# Patient Record
Sex: FEMALE | Race: WHITE | NOT HISPANIC OR LATINO | URBAN - NONMETROPOLITAN AREA
[De-identification: names, ages, dates, MRNs, and addresses within clinical notes are randomized per-mention and may not be internally consistent; named-entity substitution may affect disease eponyms.]

---

## 2020-12-10 ENCOUNTER — OFFICE VISIT (OUTPATIENT)
Dept: URBAN - NONMETROPOLITAN AREA CLINIC 13 | Facility: CLINIC | Age: 25
End: 2020-12-10
Payer: COMMERCIAL

## 2020-12-10 DIAGNOSIS — Z3A.01 LESS THAN 8 WEEKS GESTATION OF PREGNANCY: ICD-10-CM

## 2020-12-10 DIAGNOSIS — K62.5 RECTAL BLEEDING: ICD-10-CM

## 2020-12-10 PROCEDURE — G8482 FLU IMMUNIZE ORDER/ADMIN: HCPCS | Performed by: INTERNAL MEDICINE

## 2020-12-10 PROCEDURE — 99205 OFFICE O/P NEW HI 60 MIN: CPT | Performed by: INTERNAL MEDICINE

## 2020-12-10 PROCEDURE — 99204 OFFICE O/P NEW MOD 45 MIN: CPT | Performed by: INTERNAL MEDICINE

## 2020-12-10 PROCEDURE — 1036F TOBACCO NON-USER: CPT | Performed by: INTERNAL MEDICINE

## 2020-12-10 PROCEDURE — G8427 DOCREV CUR MEDS BY ELIG CLIN: HCPCS | Performed by: INTERNAL MEDICINE

## 2020-12-10 PROCEDURE — G8420 CALC BMI NORM PARAMETERS: HCPCS | Performed by: INTERNAL MEDICINE

## 2020-12-10 PROCEDURE — G9903 PT SCRN TBCO ID AS NON USER: HCPCS | Performed by: INTERNAL MEDICINE

## 2020-12-10 NOTE — PHYSICAL EXAM GASTROINTESTINAL
Abdomen , soft, nontender, nondistended , no guarding or rigidity , no masses palpable , normal bowel sounds , Liver and Spleen , no hepatomegaly present , no hepatosplenomegaly , liver nontender , spleen not palpable , Rectal , normal sphincter tone , no internal hemorrhoids, rectal masses. There is blood on the examing glove and the stool is strongly heme +.

## 2020-12-10 NOTE — HPI-TODAY'S VISIT:
This is the first office visit for this 25-year-old white female who presents with a month history of rectal bleeding.  Patient is 5 weeks pregnant. The blood is mixed in with the stool and does color the water in the commode red.  She never passes blood alone.  She states that her stools are softer than normal but appear to be adequate.  She has no diarrhea.  She has a single bowel movement daily.  She does have the urge to defecate without passing stool several times a  day.  She may, on occasion, pass mucus streaked with blood.  She has no rectal pain, burning or itching.  She is not aware of any significant hemorrhoids.  She has no abdominal pain but does feel bloated.  She is currently on no medications other than prenatal vitamins. There is no family history of colon polyps or colon cancer.  Her 3-year-old daughter has ulcerative colitis and is controlled on Remicade.  Patient did go to the emergency room because of the bleeding.  Her labs there were normal.

## 2020-12-14 ENCOUNTER — TELEPHONE ENCOUNTER (OUTPATIENT)
Dept: URBAN - METROPOLITAN AREA CLINIC 92 | Facility: CLINIC | Age: 25
End: 2020-12-14

## 2020-12-14 ENCOUNTER — LAB OUTSIDE AN ENCOUNTER (OUTPATIENT)
Dept: URBAN - METROPOLITAN AREA CLINIC 92 | Facility: CLINIC | Age: 25
End: 2020-12-14

## 2020-12-14 RX ORDER — SODIUM PICOSULFATE, MAGNESIUM OXIDE, AND ANHYDROUS CITRIC ACID 10; 3.5; 12 MG/160ML; G/160ML; G/160ML
DRINK 160 ML LIQUID ORAL
Qty: 1 KIT | Refills: 0 | OUTPATIENT
Start: 2020-12-14 | End: 2020-12-15

## 2020-12-17 ENCOUNTER — TELEPHONE ENCOUNTER (OUTPATIENT)
Dept: URBAN - NONMETROPOLITAN AREA CLINIC 2 | Facility: CLINIC | Age: 25
End: 2020-12-17

## 2020-12-28 ENCOUNTER — OFFICE VISIT (OUTPATIENT)
Dept: URBAN - METROPOLITAN AREA MEDICAL CENTER 1 | Facility: MEDICAL CENTER | Age: 25
End: 2020-12-28
Payer: COMMERCIAL

## 2020-12-28 DIAGNOSIS — K51.011 CHRONIC ULCERATIVE ENTEROCOLITIS WITH RECTAL BLEEDING: ICD-10-CM

## 2020-12-28 PROCEDURE — G9937 DIG OR SURV COLSCO: HCPCS | Performed by: INTERNAL MEDICINE

## 2020-12-28 PROCEDURE — 45380 COLONOSCOPY AND BIOPSY: CPT | Performed by: INTERNAL MEDICINE

## 2020-12-30 ENCOUNTER — TELEPHONE ENCOUNTER (OUTPATIENT)
Dept: URBAN - METROPOLITAN AREA CLINIC 92 | Facility: CLINIC | Age: 25
End: 2020-12-30

## 2020-12-30 RX ORDER — CERTOLIZUMAB PEGOL 400 MG
AS DIRECTED KIT SUBCUTANEOUS
Qty: 1 KIT | Refills: 6 | Status: ACTIVE | COMMUNITY
Start: 2020-12-30 | End: 2021-01-06

## 2020-12-30 RX ORDER — DICYCLOMINE HYDROCHLORIDE 10 MG/1
1 TABLET CAPSULE ORAL THREE TIMES A DAY
Qty: 90 TABLET | Refills: 3 | OUTPATIENT
Start: 2020-12-30 | End: 2021-04-29

## 2020-12-30 RX ORDER — CERTOLIZUMAB PEGOL 400 MG
AS DIRECTED KIT SUBCUTANEOUS
Qty: 1 KIT | Refills: 6 | OUTPATIENT
Start: 2020-12-30 | End: 2021-01-06

## 2021-01-08 ENCOUNTER — TELEPHONE ENCOUNTER (OUTPATIENT)
Dept: URBAN - NONMETROPOLITAN AREA CLINIC 1 | Facility: CLINIC | Age: 26
End: 2021-01-08

## 2021-01-08 ENCOUNTER — TELEPHONE ENCOUNTER (OUTPATIENT)
Dept: URBAN - METROPOLITAN AREA CLINIC 92 | Facility: CLINIC | Age: 26
End: 2021-01-08

## 2021-01-08 RX ORDER — ADALIMUMAB 80MG/0.8ML
2 SHOTS SQ ON DAY 1 FOLLOWED BY 1 SHOT ON DAY 15, THEN BEGIN 40 MG QOWK ON DAY 29 KIT SUBCUTANEOUS ONCE
Qty: 1 KIT | Refills: 0 | OUTPATIENT
Start: 2021-01-08 | End: 2021-02-07

## 2021-01-08 RX ORDER — DICYCLOMINE HYDROCHLORIDE 10 MG/1
1 TABLET CAPSULE ORAL THREE TIMES A DAY
Qty: 90 TABLET | Refills: 3 | Status: ACTIVE | COMMUNITY
Start: 2020-12-30 | End: 2021-04-29

## 2021-01-09 ENCOUNTER — TELEPHONE ENCOUNTER (OUTPATIENT)
Dept: URBAN - METROPOLITAN AREA CLINIC 92 | Facility: CLINIC | Age: 26
End: 2021-01-09

## 2021-01-09 ENCOUNTER — LAB OUTSIDE AN ENCOUNTER (OUTPATIENT)
Dept: URBAN - METROPOLITAN AREA CLINIC 92 | Facility: CLINIC | Age: 26
End: 2021-01-09

## 2021-01-21 ENCOUNTER — TELEPHONE ENCOUNTER (OUTPATIENT)
Dept: URBAN - METROPOLITAN AREA SURGERY CENTER 30 | Facility: SURGERY CENTER | Age: 26
End: 2021-01-21

## 2021-02-05 ENCOUNTER — TELEPHONE ENCOUNTER (OUTPATIENT)
Dept: URBAN - NONMETROPOLITAN AREA CLINIC 1 | Facility: CLINIC | Age: 26
End: 2021-02-05

## 2021-02-16 ENCOUNTER — TELEPHONE ENCOUNTER (OUTPATIENT)
Dept: URBAN - NONMETROPOLITAN AREA CLINIC 2 | Facility: CLINIC | Age: 26
End: 2021-02-16

## 2021-03-01 ENCOUNTER — TELEPHONE ENCOUNTER (OUTPATIENT)
Dept: URBAN - NONMETROPOLITAN AREA CLINIC 1 | Facility: CLINIC | Age: 26
End: 2021-03-01

## 2021-03-01 RX ORDER — DICYCLOMINE HYDROCHLORIDE 10 MG/1
1 TABLET CAPSULE ORAL THREE TIMES A DAY
Qty: 90 TABLET | Refills: 3 | COMMUNITY
Start: 2020-12-30 | End: 2021-04-29

## 2021-03-12 ENCOUNTER — TELEPHONE ENCOUNTER (OUTPATIENT)
Dept: URBAN - NONMETROPOLITAN AREA CLINIC 1 | Facility: CLINIC | Age: 26
End: 2021-03-12

## 2021-04-02 ENCOUNTER — TELEPHONE ENCOUNTER (OUTPATIENT)
Dept: URBAN - NONMETROPOLITAN AREA CLINIC 1 | Facility: CLINIC | Age: 26
End: 2021-04-02

## 2021-05-04 ENCOUNTER — TELEPHONE ENCOUNTER (OUTPATIENT)
Dept: URBAN - NONMETROPOLITAN AREA CLINIC 1 | Facility: CLINIC | Age: 26
End: 2021-05-04

## 2021-05-06 ENCOUNTER — OFFICE VISIT (OUTPATIENT)
Dept: URBAN - NONMETROPOLITAN AREA CLINIC 13 | Facility: CLINIC | Age: 26
End: 2021-05-06
Payer: COMMERCIAL

## 2021-05-06 ENCOUNTER — WEB ENCOUNTER (OUTPATIENT)
Dept: URBAN - NONMETROPOLITAN AREA CLINIC 13 | Facility: CLINIC | Age: 26
End: 2021-05-06

## 2021-05-06 DIAGNOSIS — K51.211 ULCERATIVE PROCTITIS WITH RECTAL BLEEDING: ICD-10-CM

## 2021-05-06 DIAGNOSIS — K62.5 RECTAL BLEEDING: ICD-10-CM

## 2021-05-06 PROCEDURE — 99214 OFFICE O/P EST MOD 30 MIN: CPT | Performed by: INTERNAL MEDICINE

## 2021-05-06 RX ORDER — ADALIMUMAB 40MG/0.4ML
0.4 ML KIT SUBCUTANEOUS EVERY 2 WEEKS
Status: ACTIVE | COMMUNITY

## 2021-05-06 RX ORDER — PREDNISONE 10 MG/1
4 TABLETS X 3 DAYS, THEN 3 TABLETS X 3 DAYS, THEN 2 TABLETS X 3 DAYS, THEN 1 TABLET X 3 DAYS TABLET ORAL ONCE A DAY
Qty: 30 TABLETS | Refills: 0 | OUTPATIENT
Start: 2021-05-06 | End: 2021-05-18

## 2021-05-06 NOTE — HPI-TODAY'S VISIT:
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 She is 28 weeks gestation. She presents today with loose stools and rectal bleeding and notes some mucous--ongoing for 3-4 days. She also has lower abdominal tenderness. She is feels more fatigued but isn't sure if its pregnancy or related to her UC flare. She had labs last week with her midwives but unsure of what was checked and unsure of results. She is concerned she may be anemic. Last Hgb 12/2021 was 12.2.  As above, dx with ulcerative proctitis in 12/2020 with inflammation 5-7cm proximal to the dentate line. Also with some punctate ulcerations surroundingthe  appendiceal orifice and normal colon mucosa othewrise and normal TI. Path c/w colitis. She started Humira in March and is on every other day dosing. Last dose was last Friday, 4/30/21. Her symptoms did improve with Humira but did not resolve and she was treated with pred taper the second week of March. Her rectal bleeding resolved for 2 weeks and then began again intermittently. +lower abdominal pain, worse with eating.  She does report feeling very weak and lightheaded with a cold sweat about 4-5 days after her Humira shots. We have not check adalimumab levels.  TG

## 2021-05-10 ENCOUNTER — TELEPHONE ENCOUNTER (OUTPATIENT)
Dept: URBAN - METROPOLITAN AREA CLINIC 92 | Facility: CLINIC | Age: 26
End: 2021-05-10

## 2021-05-19 LAB — CALPROTECTIN, FECAL: 126

## 2021-05-20 ENCOUNTER — OFFICE VISIT (OUTPATIENT)
Dept: URBAN - NONMETROPOLITAN AREA CLINIC 13 | Facility: CLINIC | Age: 26
End: 2021-05-20
Payer: COMMERCIAL

## 2021-05-20 ENCOUNTER — TELEPHONE ENCOUNTER (OUTPATIENT)
Dept: URBAN - NONMETROPOLITAN AREA CLINIC 2 | Facility: CLINIC | Age: 26
End: 2021-05-20

## 2021-05-20 ENCOUNTER — TELEPHONE ENCOUNTER (OUTPATIENT)
Dept: URBAN - METROPOLITAN AREA CLINIC 92 | Facility: CLINIC | Age: 26
End: 2021-05-20

## 2021-05-20 DIAGNOSIS — D50.8 OTHER IRON DEFICIENCY ANEMIA: ICD-10-CM

## 2021-05-20 DIAGNOSIS — Z34.93 PREGNANT AND NOT YET DELIVERED IN THIRD TRIMESTER: ICD-10-CM

## 2021-05-20 DIAGNOSIS — K51.211 ULCERATIVE PROCTITIS WITH RECTAL BLEEDING: ICD-10-CM

## 2021-05-20 DIAGNOSIS — D72.829 LEUKOCYTOSIS, UNSPECIFIED: ICD-10-CM

## 2021-05-20 DIAGNOSIS — Z83.79 FAMILY HISTORY OF ULCERATIVE COLITIS: ICD-10-CM

## 2021-05-20 PROCEDURE — 99214 OFFICE O/P EST MOD 30 MIN: CPT | Performed by: INTERNAL MEDICINE

## 2021-05-20 RX ORDER — PREDNISONE 10 MG/1
40 MG X 3 WEEKS, 30MG X 1 WEEKS, 20MG X 1 WEEK, 10MG X 1 WEEK TABLET ORAL ONCE A DAY
Qty: 150 TABLET | Refills: 0
Start: 2021-05-20 | End: 2021-07-01

## 2021-05-20 RX ORDER — BUDESONIDE 28 MG/1
1 APPLICATION AEROSOL, FOAM RECTAL TWICE A DAY
Qty: 1 CANISTER | Refills: 2 | OUTPATIENT
Start: 2021-05-20 | End: 2021-07-01

## 2021-05-20 RX ORDER — ADALIMUMAB 40MG/0.4ML
1 SUBQ Q  WEEK KIT SUBCUTANEOUS
Qty: 12 PRE-FILLED PEN SYRINGE | Refills: 1 | OUTPATIENT
Start: 2021-05-20 | End: 2021-11-04

## 2021-05-20 RX ORDER — PREDNISONE 10 MG/1
TAPER AS INSTRUCTED TABLET ORAL ONCE A DAY
Qty: 150 TABLETS | Refills: 0 | OUTPATIENT
Start: 2021-05-20 | End: 2021-07-01

## 2021-05-20 RX ORDER — ADALIMUMAB 40MG/0.4ML
0.4 ML KIT SUBCUTANEOUS EVERY 2 WEEKS
Status: ACTIVE | COMMUNITY

## 2021-05-20 NOTE — HPI-TODAY'S VISIT:
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 She is 28 weeks gestation. She presents today with loose stools and rectal bleeding and notes some mucous--ongoing for 3-4 days. She also has lower abdominal tenderness. She is feels more fatigued but isn't sure if its pregnancy or related to her UC flare. She had labs last week with her midwives but unsure of what was checked and unsure of results. She is concerned she may be anemic. Last Hgb 12/2021 was 12.2.  As above, dx with ulcerative proctitis in 12/2020 with inflammation 5-7cm proximal to the dentate line. Also with some punctate ulcerations surroundingthe  appendiceal orifice and normal colon mucosa othewrise and normal TI. Path c/w colitis. She started Humira in March and is on every other day dosing. Last dose was last Friday, 4/30/21. Her symptoms did improve with Humira but did not resolve and she was treated with pred taper the second week of March. Her rectal bleeding resolved for 2 weeks and then began again intermittently. +lower abdominal pain, worse with eating.  She does report feeling very weak and lightheaded with a cold sweat about 4-5 days after her Humira shots. We have not check adalimumab levels.  TG  5/20/21 Summer presents for follow up with UC Flare. She is now 30 weeks gestation. Her rectal bleeding and mucous improved with prednisone but symptoms returned as she tapered down from 30mg to 20mg. She is have rectal bleeding several times daily and waking up with symptoms at night. She is passing blood clots and mucous. She has tenesmus and feels constipated. She has a pinpoint pain in the RLQ. She is having some cramping that she thought may be shannan hurtado contractions.  No fever or chills.  We have not received her lab results from [facility]. Will call for these.  Today she reports that her daughter (4yo) was diagnosed with UC at 18months and is on Remicade.

## 2021-05-21 ENCOUNTER — TELEPHONE ENCOUNTER (OUTPATIENT)
Dept: URBAN - METROPOLITAN AREA CLINIC 92 | Facility: CLINIC | Age: 26
End: 2021-05-21

## 2021-05-24 LAB
A/G RATIO: 1.6
ADALIMUMAB DRUG LEVEL: 11
ALBUMIN: 3.8
ALKALINE PHOSPHATASE: 78
ALT (SGPT): 9
ANTI-ADALIMUMAB ANTIBODY: <25
AST (SGOT): 10
BASO (ABSOLUTE): 0
BASOS: 0
BILIRUBIN, TOTAL: <0.2
BUN/CREATININE RATIO: 17
BUN: 7
C-REACTIVE PROTEIN, QUANT: <1
CALCIUM: 8.9
CARBON DIOXIDE, TOTAL: 21
CHLORIDE: 101
CREATININE: 0.41
EGFR IF AFRICN AM: 164
EGFR IF NONAFRICN AM: 142
EOS (ABSOLUTE): 0.1
EOS: 1
FERRITIN, SERUM: 8
GLOBULIN, TOTAL: 2.4
GLUCOSE: 90
HEMATOCRIT: 30.8
HEMATOLOGY COMMENTS:: (no result)
HEMOGLOBIN: 10.1
IMMATURE CELLS: (no result)
IMMATURE GRANS (ABS): 0.1
IMMATURE GRANULOCYTES: 1
IRON BIND.CAP.(TIBC): 575
IRON SATURATION: 15
IRON: 87
LYMPHS (ABSOLUTE): 3.2
LYMPHS: 22
MCH: 29.2
MCHC: 32.8
MCV: 89
MONOCYTES(ABSOLUTE): 0.9
MONOCYTES: 6
NEUTROPHILS (ABSOLUTE): 10.6
NEUTROPHILS: 70
NRBC: (no result)
PLATELETS: 348
POTASSIUM: 4.2
PROTEIN, TOTAL: 6.2
RBC: 3.46
RDW: 12.7
SEDIMENTATION RATE-WESTERGREN: 23
SODIUM: 138
UIBC: 488
WBC: 14.9

## 2021-05-26 LAB
C-REACTIVE PROTEIN, QUANT: <1
HEMATOCRIT: 30.8
HEMOGLOBIN: 10.2
MCH: 30.1
MCHC: 33.1
MCV: 91
NRBC: (no result)
PLATELETS: 271
RBC: 3.39
RDW: 13.8
SEDIMENTATION RATE-WESTERGREN: 21
WBC: 12.2

## 2021-05-27 ENCOUNTER — OFFICE VISIT (OUTPATIENT)
Dept: URBAN - NONMETROPOLITAN AREA CLINIC 13 | Facility: CLINIC | Age: 26
End: 2021-05-27

## 2021-05-27 RX ORDER — PREDNISONE 10 MG/1
40 MG X 3 WEEKS, 30MG X 1 WEEKS, 20MG X 1 WEEK, 10MG X 1 WEEK TABLET ORAL ONCE A DAY
Qty: 150 TABLET | Refills: 0 | Status: ACTIVE | COMMUNITY
Start: 2021-05-20 | End: 2021-07-01

## 2021-05-27 RX ORDER — BUDESONIDE 28 MG/1
1 APPLICATION AEROSOL, FOAM RECTAL TWICE A DAY
Qty: 1 CANISTER | Refills: 2 | Status: ACTIVE | COMMUNITY
Start: 2021-05-20 | End: 2021-07-01

## 2021-05-27 RX ORDER — ADALIMUMAB 40MG/0.4ML
1 SUBQ Q  WEEK KIT SUBCUTANEOUS
Qty: 12 PRE-FILLED PEN SYRINGE | Refills: 1 | Status: ACTIVE | COMMUNITY
Start: 2021-05-20 | End: 2021-11-04

## 2021-05-27 RX ORDER — ADALIMUMAB 40MG/0.4ML
0.4 ML KIT SUBCUTANEOUS EVERY 2 WEEKS
Status: ACTIVE | COMMUNITY

## 2021-05-28 ENCOUNTER — OFFICE VISIT (OUTPATIENT)
Dept: URBAN - NONMETROPOLITAN AREA CLINIC 2 | Facility: CLINIC | Age: 26
End: 2021-05-28
Payer: COMMERCIAL

## 2021-05-28 DIAGNOSIS — D72.829 LEUKOCYTOSIS, UNSPECIFIED: ICD-10-CM

## 2021-05-28 DIAGNOSIS — Z34.93 PREGNANT AND NOT YET DELIVERED IN THIRD TRIMESTER: ICD-10-CM

## 2021-05-28 DIAGNOSIS — D50.8 OTHER IRON DEFICIENCY ANEMIA: ICD-10-CM

## 2021-05-28 DIAGNOSIS — K51.211 ULCERATIVE PROCTITIS WITH RECTAL BLEEDING: ICD-10-CM

## 2021-05-28 DIAGNOSIS — Z83.79 FAMILY HISTORY OF ULCERATIVE COLITIS: ICD-10-CM

## 2021-05-28 PROCEDURE — 99214 OFFICE O/P EST MOD 30 MIN: CPT | Performed by: INTERNAL MEDICINE

## 2021-05-28 RX ORDER — PREDNISONE 10 MG/1
40 MG X 3 WEEKS, 30MG X 1 WEEKS, 20MG X 1 WEEK, 10MG X 1 WEEK TABLET ORAL ONCE A DAY
Qty: 150 TABLET | Refills: 0 | Status: ACTIVE | COMMUNITY
Start: 2021-05-20 | End: 2021-07-01

## 2021-05-28 RX ORDER — ADALIMUMAB 40MG/0.4ML
0.4 ML KIT SUBCUTANEOUS EVERY 2 WEEKS
Status: ACTIVE | COMMUNITY

## 2021-05-28 RX ORDER — ADALIMUMAB 40MG/0.4ML
1 SUBQ Q  WEEK KIT SUBCUTANEOUS
Qty: 12 PRE-FILLED PEN SYRINGE | Refills: 1 | Status: ACTIVE | COMMUNITY
Start: 2021-05-20 | End: 2021-11-04

## 2021-05-28 RX ORDER — BUDESONIDE 28 MG/1
1 APPLICATION AEROSOL, FOAM RECTAL TWICE A DAY
Qty: 1 CANISTER | Refills: 2 | Status: ACTIVE | COMMUNITY
Start: 2021-05-20 | End: 2021-07-01

## 2021-05-28 NOTE — HPI-TODAY'S VISIT:
Patient comes in for follow-up of ulcerative proctitis with a recent flare. She is now about 32 weeks pregnant. She was started last week on prednisone taper, use serous foam and Humira weekly. She has not been able to get the use serous foam yet. She states that she is significantly better. She is having 1-2 soft stools daily. The bleeding has stopped. She is not having tenesmus. Her stools are nonurgent and she has no nocturnal bowel movements. She does complain of some right lower quadrant pain if she rolls over on her right side or while sitting. She does have some loose stool if she eats a high-fat diet. She has no signs or symptoms of a urinary tract infection. She has not had a kidney stone. Pain is not related to bowel movements. Overall, she feels much better.

## 2021-06-04 ENCOUNTER — TELEPHONE ENCOUNTER (OUTPATIENT)
Dept: URBAN - METROPOLITAN AREA CLINIC 92 | Facility: CLINIC | Age: 26
End: 2021-06-04

## 2021-06-04 RX ORDER — BUDESONIDE 28 MG/1
1 APPLICATION AEROSOL, FOAM RECTAL TWICE A DAY
Qty: 1 CANISTER | Refills: 2
Start: 2021-05-20 | End: 2021-07-20

## 2021-06-21 ENCOUNTER — TELEPHONE ENCOUNTER (OUTPATIENT)
Dept: URBAN - NONMETROPOLITAN AREA CLINIC 2 | Facility: CLINIC | Age: 26
End: 2021-06-21

## 2021-06-28 ENCOUNTER — OFFICE VISIT (OUTPATIENT)
Dept: URBAN - NONMETROPOLITAN AREA CLINIC 2 | Facility: CLINIC | Age: 26
End: 2021-06-28

## 2021-07-15 ENCOUNTER — TELEPHONE ENCOUNTER (OUTPATIENT)
Dept: URBAN - NONMETROPOLITAN AREA CLINIC 2 | Facility: CLINIC | Age: 26
End: 2021-07-15

## 2021-07-15 RX ORDER — BUDESONIDE 28 MG/1
1 APPLICATION AEROSOL, FOAM RECTAL TWICE A DAY
Qty: 1 CANISTER | Refills: 2
Start: 2021-05-20 | End: 2021-08-26

## 2021-08-06 ENCOUNTER — OFFICE VISIT (OUTPATIENT)
Dept: URBAN - NONMETROPOLITAN AREA CLINIC 2 | Facility: CLINIC | Age: 26
End: 2021-08-06
Payer: COMMERCIAL

## 2021-08-06 VITALS
TEMPERATURE: 92.6 F | HEIGHT: 60 IN | SYSTOLIC BLOOD PRESSURE: 124 MMHG | WEIGHT: 129 LBS | DIASTOLIC BLOOD PRESSURE: 67 MMHG | BODY MASS INDEX: 25.32 KG/M2 | HEART RATE: 94 BPM

## 2021-08-06 DIAGNOSIS — K51.211 ULCERATIVE PROCTITIS WITH RECTAL BLEEDING: ICD-10-CM

## 2021-08-06 PROCEDURE — 99214 OFFICE O/P EST MOD 30 MIN: CPT | Performed by: INTERNAL MEDICINE

## 2021-08-06 RX ORDER — ADALIMUMAB 40MG/0.4ML
0.4 ML KIT SUBCUTANEOUS EVERY 2 WEEKS
Status: ON HOLD | COMMUNITY

## 2021-08-06 RX ORDER — BUDESONIDE 28 MG/1
1 APPLICATION AEROSOL, FOAM RECTAL TWICE A DAY
Qty: 1 CANISTER | Refills: 2 | Status: ON HOLD | COMMUNITY
Start: 2021-05-20 | End: 2021-08-26

## 2021-08-06 RX ORDER — ADALIMUMAB 40MG/0.4ML
1 SUBQ Q  WEEK KIT SUBCUTANEOUS
Qty: 12 PRE-FILLED PEN SYRINGE | Refills: 1 | Status: ON HOLD | COMMUNITY
Start: 2021-05-20 | End: 2021-11-04

## 2021-08-06 NOTE — HPI-TODAY'S VISIT:
5/28/21 Patient comes in for follow-up of ulcerative proctitis with a recent flare. She is now about 32 weeks pregnant. She was started last week on prednisone taper, use serous foam and Humira weekly. She has not been able to get the use serous foam yet.  She states that she is significantly better. She is having 1-2 soft stools daily. The bleeding has stopped. She is not having tenesmus. Her stools are nonurgent and she has no nocturnal bowel movements. She does complain of some right lower quadrant pain if she rolls over on her right side or while sitting. She does have some loose stool if she eats a high-fat diet. She has no signs or symptoms of a urinary tract infection. She has not had a kidney stone. Pain is not related to bowel movements. Overall, she feels much better.  8/6/2021 Summer presents for follow up for UC. She will be 4 weeks post partum on Monday. She does have Huynh with her today. She is asymptomatic and is feeling well in terms of her GI symptoms. She denies abdominal pain, rectal bleeding, mucous in her stools, tenesmus, incomplete evacuation, nocturnal awakenings. She has a bowel movement daily. There was an issue at some point with getting her Humira when she switched insurances. She does have Humira at home but last dose was about 4 weeks ago. Previously, she was on weekly dosing. TG

## 2021-08-13 LAB
A/G RATIO: 1.9
ADALIMUMAB DRUG LEVEL: 2.9
ALBUMIN: 4.4
ALKALINE PHOSPHATASE: 73
ALT (SGPT): 53
ANTI-ADALIMUMAB ANTIBODY: 34
AST (SGOT): 22
BASO (ABSOLUTE): 0.1
BASOS: 1
BILIRUBIN, TOTAL: <0.2
BUN/CREATININE RATIO: 16
BUN: 9
C-REACTIVE PROTEIN, QUANT: <1
CALCIUM: 9.5
CARBON DIOXIDE, TOTAL: 22
CHLORIDE: 105
CREATININE: 0.56
EGFR IF AFRICN AM: 147
EGFR IF NONAFRICN AM: 127
EOS (ABSOLUTE): 0.2
EOS: 2
GLOBULIN, TOTAL: 2.3
GLUCOSE: 80
HEMATOCRIT: 40.8
HEMATOLOGY COMMENTS:: (no result)
HEMOGLOBIN: 13.1
IMMATURE CELLS: (no result)
IMMATURE GRANS (ABS): 0
IMMATURE GRANULOCYTES: 0
LYMPHS (ABSOLUTE): 3.1
LYMPHS: 48
MCH: 29.1
MCHC: 32.1
MCV: 91
MONOCYTES(ABSOLUTE): 0.4
MONOCYTES: 6
NEUTROPHILS (ABSOLUTE): 2.8
NEUTROPHILS: 43
NRBC: (no result)
PLATELETS: 283
POTASSIUM: 4.4
PROTEIN, TOTAL: 6.7
RBC: 4.5
RDW: 15.6
SEDIMENTATION RATE-WESTERGREN: 3
SODIUM: 143
WBC: 6.6

## 2021-08-17 ENCOUNTER — TELEPHONE ENCOUNTER (OUTPATIENT)
Dept: URBAN - METROPOLITAN AREA CLINIC 92 | Facility: CLINIC | Age: 26
End: 2021-08-17

## 2021-08-17 RX ORDER — VEDOLIZUMAB 300 MG/5ML
300 MG INJECTION, POWDER, LYOPHILIZED, FOR SOLUTION INTRAVENOUS
Refills: 5 | OUTPATIENT
Start: 2021-08-17 | End: 2021-08-23

## 2021-08-17 RX ORDER — VEDOLIZUMAB 300 MG/5ML
300 MG INJECTION, POWDER, LYOPHILIZED, FOR SOLUTION INTRAVENOUS
Qty: 1 VIAL | Refills: 5 | OUTPATIENT
Start: 2021-08-17 | End: 2021-08-23

## 2021-09-07 ENCOUNTER — OFFICE VISIT (OUTPATIENT)
Dept: URBAN - NONMETROPOLITAN AREA CLINIC 1 | Facility: CLINIC | Age: 26
End: 2021-09-07
Payer: COMMERCIAL

## 2021-09-07 DIAGNOSIS — K51.80 CHRONIC PANCOLONIC ULCERATIVE COLITIS: ICD-10-CM

## 2021-09-07 PROCEDURE — 96413 CHEMO IV INFUSION 1 HR: CPT | Performed by: INTERNAL MEDICINE

## 2021-09-07 RX ORDER — ADALIMUMAB 40MG/0.4ML
1 SUBQ Q  WEEK KIT SUBCUTANEOUS
Qty: 12 PRE-FILLED PEN SYRINGE | Refills: 1 | Status: ON HOLD | COMMUNITY
Start: 2021-05-20 | End: 2021-11-04

## 2021-09-07 RX ORDER — ADALIMUMAB 40MG/0.4ML
0.4 ML KIT SUBCUTANEOUS EVERY 2 WEEKS
Status: ON HOLD | COMMUNITY

## 2021-09-20 ENCOUNTER — TELEPHONE ENCOUNTER (OUTPATIENT)
Dept: URBAN - NONMETROPOLITAN AREA CLINIC 2 | Facility: CLINIC | Age: 26
End: 2021-09-20

## 2021-09-20 RX ORDER — PREDNISONE 5 MG/1
4 TABLETS X 3 DAYS, THEN 3 TABLETS X 3 DAYS, THEN 2 TABLETS X 3 DAYS, THEN 1 TABLET X 3 DAYS TABLET ORAL ONCE A DAY
Qty: 30 TABLETS | Refills: 0 | OUTPATIENT
Start: 2021-09-20 | End: 2021-10-02

## 2021-09-28 ENCOUNTER — OFFICE VISIT (OUTPATIENT)
Dept: URBAN - NONMETROPOLITAN AREA CLINIC 1 | Facility: CLINIC | Age: 26
End: 2021-09-28
Payer: COMMERCIAL

## 2021-09-28 DIAGNOSIS — K51.00 ACUTE ULCERATIVE PANCOLITIS: ICD-10-CM

## 2021-09-28 PROCEDURE — 96413 CHEMO IV INFUSION 1 HR: CPT | Performed by: INTERNAL MEDICINE

## 2021-09-28 RX ORDER — ADALIMUMAB 40MG/0.4ML
1 SUBQ Q  WEEK KIT SUBCUTANEOUS
Qty: 12 PRE-FILLED PEN SYRINGE | Refills: 1 | Status: ON HOLD | COMMUNITY
Start: 2021-05-20 | End: 2021-11-04

## 2021-09-28 RX ORDER — ADALIMUMAB 40MG/0.4ML
0.4 ML KIT SUBCUTANEOUS EVERY 2 WEEKS
Status: ON HOLD | COMMUNITY

## 2021-09-28 RX ORDER — PREDNISONE 5 MG/1
4 TABLETS X 3 DAYS, THEN 3 TABLETS X 3 DAYS, THEN 2 TABLETS X 3 DAYS, THEN 1 TABLET X 3 DAYS TABLET ORAL ONCE A DAY
Qty: 30 TABLETS | Refills: 0 | Status: ACTIVE | COMMUNITY
Start: 2021-09-20 | End: 2021-10-02

## 2021-10-13 ENCOUNTER — TELEPHONE ENCOUNTER (OUTPATIENT)
Dept: URBAN - NONMETROPOLITAN AREA CLINIC 2 | Facility: CLINIC | Age: 26
End: 2021-10-13

## 2021-10-13 RX ORDER — ADALIMUMAB 40MG/0.4ML
0.4 ML KIT SUBCUTANEOUS EVERY 2 WEEKS
OUTPATIENT

## 2021-10-13 RX ORDER — ADALIMUMAB 40MG/0.4ML
1 SUBQ Q  WEEK KIT SUBCUTANEOUS
OUTPATIENT
Start: 2021-05-20 | End: 2021-11-04

## 2021-10-19 ENCOUNTER — TELEPHONE ENCOUNTER (OUTPATIENT)
Dept: URBAN - METROPOLITAN AREA CLINIC 92 | Facility: CLINIC | Age: 26
End: 2021-10-19

## 2021-10-19 RX ORDER — HYDROCORTISONE SODIUM SUCCINATE 100 MG/2ML
TO BE GIVEN PRIOR TO ENTYVIO INFUSION INJECTION, POWDER, FOR SOLUTION INTRAMUSCULAR; INTRAVENOUS ONCE
OUTPATIENT
Start: 2021-10-19 | End: 2021-10-20

## 2021-10-21 ENCOUNTER — TELEPHONE ENCOUNTER (OUTPATIENT)
Dept: URBAN - NONMETROPOLITAN AREA CLINIC 2 | Facility: CLINIC | Age: 26
End: 2021-10-21

## 2021-10-26 ENCOUNTER — OFFICE VISIT (OUTPATIENT)
Dept: URBAN - NONMETROPOLITAN AREA CLINIC 1 | Facility: CLINIC | Age: 26
End: 2021-10-26
Payer: COMMERCIAL

## 2021-10-26 DIAGNOSIS — K51.20 ULCERATIVE PROCTITIS: ICD-10-CM

## 2021-10-26 PROCEDURE — 96375 TX/PRO/DX INJ NEW DRUG ADDON: CPT | Performed by: INTERNAL MEDICINE

## 2021-10-26 PROCEDURE — 96413 CHEMO IV INFUSION 1 HR: CPT | Performed by: INTERNAL MEDICINE

## 2021-11-01 LAB
ANTI-VEDOLIZUMAB ANTIBODY: <25
INTERPRETATION:: (no result)
Lab: (no result)
TPMT ACTIVITY: 18.7
VEDOLIZUMAB: 49

## 2021-11-05 ENCOUNTER — TELEPHONE ENCOUNTER (OUTPATIENT)
Dept: URBAN - NONMETROPOLITAN AREA CLINIC 2 | Facility: CLINIC | Age: 26
End: 2021-11-05

## 2021-11-05 ENCOUNTER — OFFICE VISIT (OUTPATIENT)
Dept: URBAN - NONMETROPOLITAN AREA CLINIC 2 | Facility: CLINIC | Age: 26
End: 2021-11-05

## 2021-11-15 ENCOUNTER — OFFICE VISIT (OUTPATIENT)
Dept: URBAN - NONMETROPOLITAN AREA CLINIC 2 | Facility: CLINIC | Age: 26
End: 2021-11-15

## 2021-12-13 ENCOUNTER — OFFICE VISIT (OUTPATIENT)
Dept: URBAN - NONMETROPOLITAN AREA CLINIC 2 | Facility: CLINIC | Age: 26
End: 2021-12-13
Payer: COMMERCIAL

## 2021-12-13 DIAGNOSIS — Z12.11 COLON CANCER SCREENING: ICD-10-CM

## 2021-12-13 DIAGNOSIS — Z83.79 FAMILY HISTORY OF ULCERATIVE COLITIS: ICD-10-CM

## 2021-12-13 DIAGNOSIS — K51.211 ULCERATIVE PROCTITIS WITH RECTAL BLEEDING: ICD-10-CM

## 2021-12-13 PROCEDURE — 99214 OFFICE O/P EST MOD 30 MIN: CPT | Performed by: NURSE PRACTITIONER

## 2021-12-13 RX ORDER — VEDOLIZUMAB 300 MG/5ML
AS DIRECTED INJECTION, POWDER, LYOPHILIZED, FOR SOLUTION INTRAVENOUS
Status: ACTIVE | COMMUNITY

## 2021-12-13 NOTE — HPI-TODAY'S VISIT:
12/13/2021 Summer presents for follow up for UC on Entyvio every 8 weeks. Previously well controlled on Humira however she had interruption of therapy due to insurance delay in receiving her pens and subsequently developed antibodies. She was struggling with hoint pain at her last visit but this has improved. She does has some occasionaly left shoulder pain. She reports normal daily stools. No bleeding. Occasionally, she has lower abdominal cramping but this is often around the time of menstration. Appetite is normal. Overall, she is feeling well. No recent labs.  Colonoscopy 12/2020: (initial diagnosis) moderately severe ulcerative proctitis extending 5-7cm proximal to the dentate line. Normal colonic mucosa throughout the rest of the colon other than some erythema and punctate ulcerations surrounding the appendicieal orifice. Normal TI. Rectal bx with active colitis c/w UC, random colon bx with no histopathologic change. Bx at appendicieal orifice with active colitis with lamina propria neutrophils, crypt abscesses, and cryptitis. Ileal mucosa with no specific histophatologic change. TG

## 2021-12-14 LAB
A/G RATIO: 2.2
ALBUMIN: 4.7
ALKALINE PHOSPHATASE: 66
ALT (SGPT): 14
AST (SGOT): 12
BILIRUBIN, TOTAL: 0.3
BUN/CREATININE RATIO: 12
BUN: 8
C-REACTIVE PROTEIN, QUANT: <1
CALCIUM: 9.7
CARBON DIOXIDE, TOTAL: 23
CHLORIDE: 103
CREATININE: 0.67
EGFR IF AFRICN AM: 138
EGFR IF NONAFRICN AM: 120
GLOBULIN, TOTAL: 2.1
GLUCOSE: 88
HEMATOCRIT: 38.8
HEMOGLOBIN: 13.1
MCH: 29.6
MCHC: 33.8
MCV: 88
NRBC: (no result)
PLATELETS: 308
POTASSIUM: 4.6
PROTEIN, TOTAL: 6.8
RBC: 4.42
RDW: 12.1
SEDIMENTATION RATE-WESTERGREN: 4
SODIUM: 139
WBC: 5.1

## 2021-12-29 ENCOUNTER — OFFICE VISIT (OUTPATIENT)
Dept: URBAN - NONMETROPOLITAN AREA CLINIC 1 | Facility: CLINIC | Age: 26
End: 2021-12-29
Payer: COMMERCIAL

## 2021-12-29 DIAGNOSIS — K51.20 CHRONIC ULCERATIVE PROCTITIS: ICD-10-CM

## 2021-12-29 PROCEDURE — 96413 CHEMO IV INFUSION 1 HR: CPT | Performed by: INTERNAL MEDICINE

## 2021-12-29 PROCEDURE — 96375 TX/PRO/DX INJ NEW DRUG ADDON: CPT | Performed by: INTERNAL MEDICINE

## 2021-12-29 RX ORDER — VEDOLIZUMAB 300 MG/5ML
AS DIRECTED INJECTION, POWDER, LYOPHILIZED, FOR SOLUTION INTRAVENOUS
Status: ACTIVE | COMMUNITY

## 2022-02-08 ENCOUNTER — TELEPHONE ENCOUNTER (OUTPATIENT)
Dept: URBAN - NONMETROPOLITAN AREA CLINIC 1 | Facility: CLINIC | Age: 27
End: 2022-02-08

## 2022-02-22 ENCOUNTER — TELEPHONE ENCOUNTER (OUTPATIENT)
Dept: URBAN - NONMETROPOLITAN AREA CLINIC 2 | Facility: CLINIC | Age: 27
End: 2022-02-22

## 2022-02-23 ENCOUNTER — OFFICE VISIT (OUTPATIENT)
Dept: URBAN - NONMETROPOLITAN AREA CLINIC 1 | Facility: CLINIC | Age: 27
End: 2022-02-23
Payer: COMMERCIAL

## 2022-02-23 DIAGNOSIS — K51.20 ULCERATIVE PROCTITIS: ICD-10-CM

## 2022-02-23 PROCEDURE — 96375 TX/PRO/DX INJ NEW DRUG ADDON: CPT | Performed by: INTERNAL MEDICINE

## 2022-02-23 PROCEDURE — 96413 CHEMO IV INFUSION 1 HR: CPT | Performed by: INTERNAL MEDICINE

## 2022-02-23 RX ORDER — VEDOLIZUMAB 300 MG/5ML
AS DIRECTED INJECTION, POWDER, LYOPHILIZED, FOR SOLUTION INTRAVENOUS
Status: ACTIVE | COMMUNITY

## 2022-02-24 LAB
QUANTIFERON CRITERIA: (no result)
QUANTIFERON INCUBATION: (no result)
QUANTIFERON MITOGEN VALUE: >10
QUANTIFERON NIL VALUE: 0
QUANTIFERON TB1 AG VALUE: 0.02
QUANTIFERON TB2 AG VALUE: 0.03
QUANTIFERON-TB GOLD PLUS: NEGATIVE

## 2022-04-20 ENCOUNTER — OFFICE VISIT (OUTPATIENT)
Dept: URBAN - NONMETROPOLITAN AREA CLINIC 1 | Facility: CLINIC | Age: 27
End: 2022-04-20
Payer: COMMERCIAL

## 2022-04-20 DIAGNOSIS — K51.20 ULCERATIVE PROCTITIS: ICD-10-CM

## 2022-04-20 PROCEDURE — 96375 TX/PRO/DX INJ NEW DRUG ADDON: CPT | Performed by: INTERNAL MEDICINE

## 2022-04-20 PROCEDURE — 96413 CHEMO IV INFUSION 1 HR: CPT | Performed by: INTERNAL MEDICINE

## 2022-04-20 RX ORDER — VEDOLIZUMAB 300 MG/5ML
AS DIRECTED INJECTION, POWDER, LYOPHILIZED, FOR SOLUTION INTRAVENOUS
Status: ACTIVE | COMMUNITY

## 2022-05-09 ENCOUNTER — OFFICE VISIT (OUTPATIENT)
Dept: URBAN - NONMETROPOLITAN AREA CLINIC 13 | Facility: CLINIC | Age: 27
End: 2022-05-09
Payer: COMMERCIAL

## 2022-05-09 ENCOUNTER — TELEPHONE ENCOUNTER (OUTPATIENT)
Dept: URBAN - NONMETROPOLITAN AREA CLINIC 1 | Facility: CLINIC | Age: 27
End: 2022-05-09

## 2022-05-09 DIAGNOSIS — Z83.79 FAMILY HISTORY OF ULCERATIVE COLITIS: ICD-10-CM

## 2022-05-09 DIAGNOSIS — K51.211 ULCERATIVE PROCTITIS WITH RECTAL BLEEDING: ICD-10-CM

## 2022-05-09 DIAGNOSIS — Z12.11 COLON CANCER SCREENING: ICD-10-CM

## 2022-05-09 PROCEDURE — 99214 OFFICE O/P EST MOD 30 MIN: CPT | Performed by: INTERNAL MEDICINE

## 2022-05-09 RX ORDER — VEDOLIZUMAB 300 MG/5ML
AS DIRECTED INJECTION, POWDER, LYOPHILIZED, FOR SOLUTION INTRAVENOUS
Status: ACTIVE | COMMUNITY

## 2022-05-09 NOTE — HPI-TODAY'S VISIT:
12/13/2021 Summer presents for follow up for UC on Entyvio every 8 weeks. Previously well controlled on Humira however she had interruption of therapy due to insurance delay in receiving her pens and subsequently developed antibodies. She was struggling with hoint pain at her last visit but this has improved. She does has some occasionaly left shoulder pain. She reports normal daily stools. No bleeding. Occasionally, she has lower abdominal cramping but this is often around the time of menstration. Appetite is normal. Overall, she is feeling well. No recent labs.  Colonoscopy 12/2020: (initial diagnosis) moderately severe ulcerative proctitis extending 5-7cm proximal to the dentate line. Normal colonic mucosa throughout the rest of the colon other than some erythema and punctate ulcerations surrounding the appendicieal orifice. Normal TI. Rectal bx with active colitis c/w UC, random colon bx with no histopathologic change. Bx at appendicieal orifice with active colitis with lamina propria neutrophils, crypt abscesses, and cryptitis. Ileal mucosa with no specific histophatologic change. TG  5/9/22 Summer is following up for UC on Entyvio. Joint pain resolved with pretreatment with solucortef. She is having once normal stool daily. No rectal bleeding. No abdominal pain. Appetite and weight are stable. No EIM. Labs in December were normal. She is feeling well and without complaints.

## 2022-06-15 ENCOUNTER — OFFICE VISIT (OUTPATIENT)
Dept: URBAN - NONMETROPOLITAN AREA CLINIC 1 | Facility: CLINIC | Age: 27
End: 2022-06-15
Payer: COMMERCIAL

## 2022-06-15 VITALS
DIASTOLIC BLOOD PRESSURE: 76 MMHG | SYSTOLIC BLOOD PRESSURE: 117 MMHG | HEIGHT: 60 IN | WEIGHT: 123.4 LBS | BODY MASS INDEX: 24.23 KG/M2

## 2022-06-15 DIAGNOSIS — K51.20 ULCERATIVE PROCTITIS: ICD-10-CM

## 2022-06-15 PROCEDURE — 96413 CHEMO IV INFUSION 1 HR: CPT | Performed by: INTERNAL MEDICINE

## 2022-06-15 PROCEDURE — 96375 TX/PRO/DX INJ NEW DRUG ADDON: CPT | Performed by: INTERNAL MEDICINE

## 2022-06-15 RX ORDER — VEDOLIZUMAB 300 MG/5ML
AS DIRECTED INJECTION, POWDER, LYOPHILIZED, FOR SOLUTION INTRAVENOUS
Status: ACTIVE | COMMUNITY

## 2022-06-21 ENCOUNTER — TELEPHONE ENCOUNTER (OUTPATIENT)
Dept: URBAN - METROPOLITAN AREA CLINIC 92 | Facility: CLINIC | Age: 27
End: 2022-06-21

## 2022-06-21 RX ORDER — VEDOLIZUMAB 300 MG/5ML
AS DIRECTED INJECTION, POWDER, LYOPHILIZED, FOR SOLUTION INTRAVENOUS
Qty: 300 MILLIGRAMS | Refills: 0 | OUTPATIENT
Start: 2022-06-21 | End: 2022-09-19

## 2022-06-21 RX ORDER — HYDROCORTISONE SODIUM SUCCINATE 100 MG/2ML
TO BE GIVEN PRIOR TO ENTYVIO INFUSION INJECTION, POWDER, FOR SOLUTION INTRAMUSCULAR; INTRAVENOUS ONCE
Refills: 0 | OUTPATIENT
Start: 2022-06-21 | End: 2022-06-22

## 2022-08-10 ENCOUNTER — OFFICE VISIT (OUTPATIENT)
Dept: URBAN - NONMETROPOLITAN AREA CLINIC 1 | Facility: CLINIC | Age: 27
End: 2022-08-10
Payer: COMMERCIAL

## 2022-08-10 VITALS
WEIGHT: 119.8 LBS | DIASTOLIC BLOOD PRESSURE: 80 MMHG | SYSTOLIC BLOOD PRESSURE: 108 MMHG | HEIGHT: 60 IN | BODY MASS INDEX: 23.52 KG/M2

## 2022-08-10 DIAGNOSIS — K51.20 ULCERATIVE PROCTITIS: ICD-10-CM

## 2022-08-10 PROCEDURE — 96413 CHEMO IV INFUSION 1 HR: CPT | Performed by: INTERNAL MEDICINE

## 2022-08-10 RX ORDER — VEDOLIZUMAB 300 MG/5ML
AS DIRECTED INJECTION, POWDER, LYOPHILIZED, FOR SOLUTION INTRAVENOUS
Qty: 300 MILLIGRAMS | Refills: 0 | Status: ACTIVE | COMMUNITY
Start: 2022-06-21 | End: 2022-09-19

## 2022-08-10 RX ORDER — VEDOLIZUMAB 300 MG/5ML
AS DIRECTED INJECTION, POWDER, LYOPHILIZED, FOR SOLUTION INTRAVENOUS
Status: ACTIVE | COMMUNITY

## 2022-10-05 ENCOUNTER — OFFICE VISIT (OUTPATIENT)
Dept: URBAN - NONMETROPOLITAN AREA CLINIC 1 | Facility: CLINIC | Age: 27
End: 2022-10-05
Payer: COMMERCIAL

## 2022-10-05 VITALS
WEIGHT: 116.8 LBS | DIASTOLIC BLOOD PRESSURE: 68 MMHG | BODY MASS INDEX: 22.93 KG/M2 | SYSTOLIC BLOOD PRESSURE: 110 MMHG | HEIGHT: 60 IN

## 2022-10-05 DIAGNOSIS — K51.20 ULCERATIVE PROCTITIS: ICD-10-CM

## 2022-10-05 PROCEDURE — 96413 CHEMO IV INFUSION 1 HR: CPT | Performed by: INTERNAL MEDICINE

## 2022-10-05 RX ORDER — VEDOLIZUMAB 300 MG/5ML
AS DIRECTED INJECTION, POWDER, LYOPHILIZED, FOR SOLUTION INTRAVENOUS
Status: ACTIVE | COMMUNITY

## 2022-11-08 ENCOUNTER — CLAIMS CREATED FROM THE CLAIM WINDOW (OUTPATIENT)
Dept: URBAN - NONMETROPOLITAN AREA CLINIC 2 | Facility: CLINIC | Age: 27
End: 2022-11-08
Payer: COMMERCIAL

## 2022-11-08 ENCOUNTER — LAB OUTSIDE AN ENCOUNTER (OUTPATIENT)
Dept: URBAN - NONMETROPOLITAN AREA CLINIC 2 | Facility: CLINIC | Age: 27
End: 2022-11-08

## 2022-11-08 VITALS
BODY MASS INDEX: 22.38 KG/M2 | HEIGHT: 60 IN | WEIGHT: 114 LBS | TEMPERATURE: 97 F | HEART RATE: 98 BPM | DIASTOLIC BLOOD PRESSURE: 80 MMHG | SYSTOLIC BLOOD PRESSURE: 111 MMHG

## 2022-11-08 DIAGNOSIS — K51.211 ULCERATIVE PROCTITIS WITH RECTAL BLEEDING: ICD-10-CM

## 2022-11-08 DIAGNOSIS — K51.20 ULCERATIVE (CHRONIC) PROCTITIS WITHOUT COMPLICATIONS: ICD-10-CM

## 2022-11-08 DIAGNOSIS — K51.20 ULCERATIVE PROCTITIS: ICD-10-CM

## 2022-11-08 DIAGNOSIS — Z12.11 COLON CANCER SCREENING: ICD-10-CM

## 2022-11-08 DIAGNOSIS — D72.829 LEUKOCYTOSIS, UNSPECIFIED: ICD-10-CM

## 2022-11-08 DIAGNOSIS — Z83.79 FAMILY HISTORY OF ULCERATIVE COLITIS: ICD-10-CM

## 2022-11-08 DIAGNOSIS — D50.8 OTHER IRON DEFICIENCY ANEMIA: ICD-10-CM

## 2022-11-08 PROCEDURE — 99214 OFFICE O/P EST MOD 30 MIN: CPT | Performed by: INTERNAL MEDICINE

## 2022-11-08 RX ORDER — VEDOLIZUMAB 300 MG/5ML
AS DIRECTED INJECTION, POWDER, LYOPHILIZED, FOR SOLUTION INTRAVENOUS
Status: ACTIVE | COMMUNITY

## 2022-11-08 RX ORDER — SODIUM PICOSULFATE, MAGNESIUM OXIDE, AND ANHYDROUS CITRIC ACID 10; 3.5; 12 MG/160ML; G/160ML; G/160ML
320ML LIQUID ORAL AS DIRECTED
Qty: 320 MILLILITER | Refills: 0 | OUTPATIENT
Start: 2022-11-08 | End: 2022-11-09

## 2022-11-08 NOTE — HPI-TODAY'S VISIT:
12/13/2021 Summer presents for follow up for UC on Entyvio every 8 weeks. Previously well controlled on Humira however she had interruption of therapy due to insurance delay in receiving her pens and subsequently developed antibodies. She was struggling with hoint pain at her last visit but this has improved. She does has some occasionaly left shoulder pain. She reports normal daily stools. No bleeding. Occasionally, she has lower abdominal cramping but this is often around the time of menstration. Appetite is normal. Overall, she is feeling well. No recent labs.  Colonoscopy 12/2020: (initial diagnosis) moderately severe ulcerative proctitis extending 5-7cm proximal to the dentate line. Normal colonic mucosa throughout the rest of the colon other than some erythema and punctate ulcerations surrounding the appendicieal orifice. Normal TI. Rectal bx with active colitis c/w UC, random colon bx with no histopathologic change. Bx at appendicieal orifice with active colitis with lamina propria neutrophils, crypt abscesses, and cryptitis. Ileal mucosa with no specific histophatologic change. TG  5/9/22 Summer is following up for UC on Entyvio. Joint pain resolved with pretreatment with solucortef. She is having once normal stool daily. No rectal bleeding. No abdominal pain. Appetite and weight are stable. No EIM. Labs in December were normal. She is feeling well and without complaints. TG 11/8/2022 The patient presents today for follow-up of her ulcerative colitis on Entyvio.  Since her last visit, she has been doing quite well.  She is on Entyvio every 8 weeks.  She is now not having pretreatment with Solu-Cortef, and her joint pain has resolved.  She has 1-2 formed bowel movements daily.  Her labs have been normal.  She is due for a repeat colonoscopy.  She is also due for repeat labs today.  We will see her back in the office after her colonoscopy.

## 2022-11-09 ENCOUNTER — TELEPHONE ENCOUNTER (OUTPATIENT)
Dept: URBAN - METROPOLITAN AREA SURGERY CENTER 30 | Facility: SURGERY CENTER | Age: 27
End: 2022-11-09

## 2022-11-09 LAB
A/G RATIO: 2
ABSOLUTE BASOPHILS: 38
ABSOLUTE EOSINOPHILS: 70
ABSOLUTE LYMPHOCYTES: 2668
ABSOLUTE MONOCYTES: 308
ABSOLUTE NEUTROPHILS: 2317
ALBUMIN: 4.8
ALKALINE PHOSPHATASE: 55
ALT (SGPT): 11
AST (SGOT): 10
BASOPHILS: 0.7
BILIRUBIN, TOTAL: 0.4
BUN/CREATININE RATIO: (no result)
BUN: 13
C-REACTIVE PROTEIN, QUANT: <0.2
CALCIUM: 9.5
CARBON DIOXIDE, TOTAL: 28
CHLORIDE: 104
CREATININE: 0.65
EGFR: 122
EOSINOPHILS: 1.3
GLOBULIN, TOTAL: 2.4
GLUCOSE: 80
HEMATOCRIT: 40.7
HEMOGLOBIN: 13.5
LYMPHOCYTES: 49.4
MCH: 29.2
MCHC: 33.2
MCV: 87.9
MONOCYTES: 5.7
MPV: 11
NEUTROPHILS: 42.9
PLATELET COUNT: 304
POTASSIUM: 4.5
PROTEIN, TOTAL: 7.2
RDW: 12.3
RED BLOOD CELL COUNT: 4.63
SED RATE BY MODIFIED: 2
SODIUM: 140
WHITE BLOOD CELL COUNT: 5.4

## 2022-11-30 ENCOUNTER — OFFICE VISIT (OUTPATIENT)
Dept: URBAN - NONMETROPOLITAN AREA CLINIC 1 | Facility: CLINIC | Age: 27
End: 2022-11-30
Payer: COMMERCIAL

## 2022-11-30 VITALS
WEIGHT: 113 LBS | HEIGHT: 60 IN | BODY MASS INDEX: 22.19 KG/M2 | DIASTOLIC BLOOD PRESSURE: 77 MMHG | SYSTOLIC BLOOD PRESSURE: 108 MMHG

## 2022-11-30 DIAGNOSIS — K51.211 ULCERATIVE PROCTITIS WITH RECTAL BLEEDING: ICD-10-CM

## 2022-11-30 PROCEDURE — 96413 CHEMO IV INFUSION 1 HR: CPT | Performed by: INTERNAL MEDICINE

## 2022-11-30 RX ORDER — VEDOLIZUMAB 300 MG/5ML
AS DIRECTED INJECTION, POWDER, LYOPHILIZED, FOR SOLUTION INTRAVENOUS
Status: ACTIVE | COMMUNITY

## 2023-01-20 ENCOUNTER — CLAIMS CREATED FROM THE CLAIM WINDOW (OUTPATIENT)
Dept: URBAN - NONMETROPOLITAN AREA SURGERY CENTER 1 | Facility: SURGERY CENTER | Age: 28
End: 2023-01-20
Payer: COMMERCIAL

## 2023-01-20 ENCOUNTER — CLAIMS CREATED FROM THE CLAIM WINDOW (OUTPATIENT)
Dept: URBAN - NONMETROPOLITAN AREA SURGERY CENTER 1 | Facility: SURGERY CENTER | Age: 28
End: 2023-01-20

## 2023-01-20 DIAGNOSIS — K51.00 ACUTE ULCERATIVE PANCOLITIS: ICD-10-CM

## 2023-01-20 PROCEDURE — G8907 PT DOC NO EVENTS ON DISCHARG: HCPCS | Performed by: INTERNAL MEDICINE

## 2023-01-20 PROCEDURE — 45380 COLONOSCOPY AND BIOPSY: CPT | Performed by: INTERNAL MEDICINE

## 2023-01-25 ENCOUNTER — OFFICE VISIT (OUTPATIENT)
Dept: URBAN - NONMETROPOLITAN AREA CLINIC 1 | Facility: CLINIC | Age: 28
End: 2023-01-25
Payer: COMMERCIAL

## 2023-01-25 ENCOUNTER — OFFICE VISIT (OUTPATIENT)
Dept: URBAN - NONMETROPOLITAN AREA CLINIC 1 | Facility: CLINIC | Age: 28
End: 2023-01-25

## 2023-01-25 VITALS
SYSTOLIC BLOOD PRESSURE: 121 MMHG | HEIGHT: 60 IN | WEIGHT: 107 LBS | DIASTOLIC BLOOD PRESSURE: 88 MMHG | BODY MASS INDEX: 21.01 KG/M2

## 2023-01-25 DIAGNOSIS — K51.20 ULCERATIVE PROCTITIS: ICD-10-CM

## 2023-01-25 PROCEDURE — 96413 CHEMO IV INFUSION 1 HR: CPT | Performed by: INTERNAL MEDICINE

## 2023-01-25 RX ORDER — VEDOLIZUMAB 300 MG/5ML
AS DIRECTED INJECTION, POWDER, LYOPHILIZED, FOR SOLUTION INTRAVENOUS
Status: ACTIVE | COMMUNITY

## 2023-02-15 ENCOUNTER — TELEPHONE ENCOUNTER (OUTPATIENT)
Dept: URBAN - NONMETROPOLITAN AREA CLINIC 1 | Facility: CLINIC | Age: 28
End: 2023-02-15

## 2023-03-22 ENCOUNTER — OFFICE VISIT (OUTPATIENT)
Dept: URBAN - NONMETROPOLITAN AREA CLINIC 1 | Facility: CLINIC | Age: 28
End: 2023-03-22
Payer: COMMERCIAL

## 2023-03-22 VITALS
WEIGHT: 105 LBS | SYSTOLIC BLOOD PRESSURE: 113 MMHG | BODY MASS INDEX: 20.62 KG/M2 | DIASTOLIC BLOOD PRESSURE: 73 MMHG | HEIGHT: 60 IN

## 2023-03-22 DIAGNOSIS — K51.211 ULCERATIVE PROCTITIS WITH RECTAL BLEEDING: ICD-10-CM

## 2023-03-22 PROCEDURE — 96413 CHEMO IV INFUSION 1 HR: CPT | Performed by: INTERNAL MEDICINE

## 2023-03-22 RX ORDER — VEDOLIZUMAB 300 MG/5ML
AS DIRECTED INJECTION, POWDER, LYOPHILIZED, FOR SOLUTION INTRAVENOUS
Status: ACTIVE | COMMUNITY

## 2023-03-26 LAB
HBSAG SCREEN: (no result)
HEP A AB, IGM: (no result)
HEP B CORE AB, IGM: (no result)
HEP C VIRUS AB: <0.02
HEPATITIS C ANTIBODY: (no result)
MITOGEN-NIL: >10
QUANTIFERON NIL VALUE: 0.04
QUANTIFERON TB1 AG VALUE: 0.03
QUANTIFERON TB2 AG VALUE: 0.01
QUANTIFERON-TB GOLD PLUS: NEGATIVE

## 2023-03-28 ENCOUNTER — TELEPHONE ENCOUNTER (OUTPATIENT)
Dept: URBAN - METROPOLITAN AREA CLINIC 35 | Facility: CLINIC | Age: 28
End: 2023-03-28

## 2023-05-08 ENCOUNTER — DASHBOARD ENCOUNTERS (OUTPATIENT)
Age: 28
End: 2023-05-08

## 2023-05-08 ENCOUNTER — OFFICE VISIT (OUTPATIENT)
Dept: URBAN - NONMETROPOLITAN AREA CLINIC 2 | Facility: CLINIC | Age: 28
End: 2023-05-08
Payer: COMMERCIAL

## 2023-05-08 VITALS
TEMPERATURE: 98.5 F | HEIGHT: 60 IN | HEART RATE: 101 BPM | BODY MASS INDEX: 20.03 KG/M2 | SYSTOLIC BLOOD PRESSURE: 100 MMHG | DIASTOLIC BLOOD PRESSURE: 69 MMHG | WEIGHT: 102 LBS

## 2023-05-08 DIAGNOSIS — K51.211 ULCERATIVE PROCTITIS WITH RECTAL BLEEDING: ICD-10-CM

## 2023-05-08 DIAGNOSIS — D72.829 LEUKOCYTOSIS, UNSPECIFIED: ICD-10-CM

## 2023-05-08 DIAGNOSIS — D50.8 OTHER IRON DEFICIENCY ANEMIA: ICD-10-CM

## 2023-05-08 DIAGNOSIS — Z12.11 COLON CANCER SCREENING: ICD-10-CM

## 2023-05-08 DIAGNOSIS — Z83.79 FAMILY HISTORY OF ULCERATIVE COLITIS: ICD-10-CM

## 2023-05-08 PROBLEM — 87522002: Status: ACTIVE | Noted: 2021-05-10

## 2023-05-08 PROBLEM — 305058001: Status: ACTIVE | Noted: 2021-12-13

## 2023-05-08 PROBLEM — 111583006: Status: ACTIVE | Noted: 2021-05-20

## 2023-05-08 PROBLEM — 275129008: Status: ACTIVE | Noted: 2021-05-20

## 2023-05-08 PROBLEM — 3951002: Status: ACTIVE | Noted: 2020-12-30

## 2023-05-08 PROCEDURE — 99214 OFFICE O/P EST MOD 30 MIN: CPT | Performed by: NURSE PRACTITIONER

## 2023-05-08 RX ORDER — ESCITALOPRAM OXALATE 5 MG/1
TABLET ORAL
Qty: 30 TABLET | Status: ACTIVE | COMMUNITY

## 2023-05-08 RX ORDER — VEDOLIZUMAB 300 MG/5ML
AS DIRECTED INJECTION, POWDER, LYOPHILIZED, FOR SOLUTION INTRAVENOUS
Status: ACTIVE | COMMUNITY

## 2023-05-08 RX ORDER — RIMEGEPANT SULFATE 75 MG/75MG
TABLET, ORALLY DISINTEGRATING ORAL
Qty: 8 EACH | Status: ACTIVE | COMMUNITY

## 2023-05-08 RX ORDER — TOPIRAMATE 25 MG/1
TAKE ONE TABLET BY MOUTH TWICE A DAY TABLET, FILM COATED ORAL
Qty: 60 UNSPECIFIED | Refills: 2 | Status: ACTIVE | COMMUNITY

## 2023-05-08 RX ORDER — LAMOTRIGINE 25 MG/1
TAKE ONE TABLET BY MOUTH ONE TIME DAILY FOR 14 DAYS THEN TAKE TWO TABLETS BY MOUTH ONE TIME DAILY FOR MOOD STABILIZATION/ DEPRESSION. REPORT TABLET ORAL
Qty: 45 UNSPECIFIED | Refills: 0 | Status: ACTIVE | COMMUNITY

## 2023-05-08 NOTE — HPI-TODAY'S VISIT:
12/13/2021 Summer presents for follow up for UC on Entyvio every 8 weeks. Previously well controlled on Humira however she had interruption of therapy due to insurance delay in receiving her pens and subsequently developed antibodies. She was struggling with hoint pain at her last visit but this has improved. She does has some occasionaly left shoulder pain. She reports normal daily stools. No bleeding. Occasionally, she has lower abdominal cramping but this is often around the time of menstration. Appetite is normal. Overall, she is feeling well. No recent labs.  Colonoscopy 12/2020: (initial diagnosis) moderately severe ulcerative proctitis extending 5-7cm proximal to the dentate line. Normal colonic mucosa throughout the rest of the colon other than some erythema and punctate ulcerations surrounding the appendicieal orifice. Normal TI. Rectal bx with active colitis c/w UC, random colon bx with no histopathologic change. Bx at appendicieal orifice with active colitis with lamina propria neutrophils, crypt abscesses, and cryptitis. Ileal mucosa with no specific histophatologic change. TG  5/9/22 Summer is following up for UC on Entyvio. Joint pain resolved with pretreatment with solucortef. She is having once normal stool daily. No rectal bleeding. No abdominal pain. Appetite and weight are stable. No EIM. Labs in December were normal. She is feeling well and without complaints. TG 11/8/2022 The patient presents today for follow-up of her ulcerative colitis on Entyvio.  Since her last visit, she has been doing quite well.  She is on Entyvio every 8 weeks.  She is now not having pretreatment with Solu-Cortef, and her joint pain has resolved.  She has 1-2 formed bowel movements daily.  Her labs have been normal.  She is due for a repeat colonoscopy.  She is also due for repeat labs today.  We will see her back in the office after her colonoscopy. 5/8/2023 Summer presents for follow-up of ulcerative colitis.  Her colonoscopy in January shows endoscopic and histologic remission.  She is off Solu-Medrol pretreatment for Entyvio and now just on 300 mg every 8 weeks.  She is having a bowel movement daily with no bleeding or pain.  Today she is doing well with no new GI complaints.  MB

## 2023-05-17 ENCOUNTER — OFFICE VISIT (OUTPATIENT)
Dept: URBAN - NONMETROPOLITAN AREA CLINIC 1 | Facility: CLINIC | Age: 28
End: 2023-05-17

## 2023-05-31 ENCOUNTER — OFFICE VISIT (OUTPATIENT)
Dept: URBAN - NONMETROPOLITAN AREA CLINIC 1 | Facility: CLINIC | Age: 28
End: 2023-05-31

## 2023-05-31 RX ORDER — LAMOTRIGINE 25 MG/1
TAKE ONE TABLET BY MOUTH ONE TIME DAILY FOR 14 DAYS THEN TAKE TWO TABLETS BY MOUTH ONE TIME DAILY FOR MOOD STABILIZATION/ DEPRESSION. REPORT TABLET ORAL
Qty: 45 UNSPECIFIED | Refills: 0 | Status: ACTIVE | COMMUNITY

## 2023-05-31 RX ORDER — ESCITALOPRAM OXALATE 5 MG/1
TABLET ORAL
Qty: 30 TABLET | Status: ACTIVE | COMMUNITY

## 2023-05-31 RX ORDER — RIMEGEPANT SULFATE 75 MG/75MG
TABLET, ORALLY DISINTEGRATING ORAL
Qty: 8 EACH | Status: ACTIVE | COMMUNITY

## 2023-05-31 RX ORDER — VEDOLIZUMAB 300 MG/5ML
AS DIRECTED INJECTION, POWDER, LYOPHILIZED, FOR SOLUTION INTRAVENOUS
Status: ACTIVE | COMMUNITY

## 2023-05-31 RX ORDER — TOPIRAMATE 25 MG/1
TAKE ONE TABLET BY MOUTH TWICE A DAY TABLET, FILM COATED ORAL
Qty: 60 UNSPECIFIED | Refills: 2 | Status: ACTIVE | COMMUNITY

## 2023-06-20 ENCOUNTER — OFFICE VISIT (OUTPATIENT)
Dept: URBAN - NONMETROPOLITAN AREA CLINIC 1 | Facility: CLINIC | Age: 28
End: 2023-06-20
Payer: COMMERCIAL

## 2023-06-20 ENCOUNTER — TELEPHONE ENCOUNTER (OUTPATIENT)
Dept: URBAN - NONMETROPOLITAN AREA CLINIC 1 | Facility: CLINIC | Age: 28
End: 2023-06-20

## 2023-06-20 VITALS
BODY MASS INDEX: 19.79 KG/M2 | WEIGHT: 100.8 LBS | HEIGHT: 60 IN | SYSTOLIC BLOOD PRESSURE: 105 MMHG | DIASTOLIC BLOOD PRESSURE: 69 MMHG | TEMPERATURE: 98.3 F

## 2023-06-20 DIAGNOSIS — K51.20 CHRONIC ULCERATIVE PROCTITIS: ICD-10-CM

## 2023-06-20 PROCEDURE — 96413 CHEMO IV INFUSION 1 HR: CPT | Performed by: INTERNAL MEDICINE

## 2023-06-20 RX ORDER — RIMEGEPANT SULFATE 75 MG/75MG
TABLET, ORALLY DISINTEGRATING ORAL
Qty: 8 EACH | Status: ACTIVE | COMMUNITY

## 2023-06-20 RX ORDER — LAMOTRIGINE 25 MG/1
TAKE ONE TABLET BY MOUTH ONE TIME DAILY FOR 14 DAYS THEN TAKE TWO TABLETS BY MOUTH ONE TIME DAILY FOR MOOD STABILIZATION/ DEPRESSION. REPORT TABLET ORAL
Qty: 45 UNSPECIFIED | Refills: 0 | Status: ACTIVE | COMMUNITY

## 2023-06-20 RX ORDER — TOPIRAMATE 25 MG/1
TAKE ONE TABLET BY MOUTH TWICE A DAY TABLET, FILM COATED ORAL
Qty: 60 UNSPECIFIED | Refills: 2 | Status: ACTIVE | COMMUNITY

## 2023-06-20 RX ORDER — VEDOLIZUMAB 300 MG/5ML
AS DIRECTED INJECTION, POWDER, LYOPHILIZED, FOR SOLUTION INTRAVENOUS
Status: ACTIVE | COMMUNITY

## 2023-06-20 RX ORDER — ESCITALOPRAM OXALATE 5 MG/1
TABLET ORAL
Qty: 30 TABLET | Status: ACTIVE | COMMUNITY

## 2023-06-20 RX ORDER — VEDOLIZUMAB 300 MG/5ML
AS DIRECTED INJECTION, POWDER, LYOPHILIZED, FOR SOLUTION INTRAVENOUS
Qty: 300 | OUTPATIENT
Start: 2023-06-21 | End: 2023-09-19

## 2023-08-15 ENCOUNTER — OFFICE VISIT (OUTPATIENT)
Dept: URBAN - NONMETROPOLITAN AREA CLINIC 1 | Facility: CLINIC | Age: 28
End: 2023-08-15
Payer: COMMERCIAL

## 2023-08-15 VITALS
TEMPERATURE: 98.5 F | BODY MASS INDEX: 19.28 KG/M2 | DIASTOLIC BLOOD PRESSURE: 70 MMHG | HEIGHT: 60 IN | SYSTOLIC BLOOD PRESSURE: 98 MMHG | WEIGHT: 98.2 LBS

## 2023-08-15 DIAGNOSIS — K51.211 ULCERATIVE PROCTITIS WITH RECTAL BLEEDING: ICD-10-CM

## 2023-08-15 PROCEDURE — 96413 CHEMO IV INFUSION 1 HR: CPT | Performed by: INTERNAL MEDICINE

## 2023-08-15 RX ORDER — VEDOLIZUMAB 300 MG/5ML
AS DIRECTED INJECTION, POWDER, LYOPHILIZED, FOR SOLUTION INTRAVENOUS
Status: ACTIVE | COMMUNITY

## 2023-08-15 RX ORDER — ESCITALOPRAM OXALATE 5 MG/1
TABLET ORAL
Qty: 30 TABLET | Status: ACTIVE | COMMUNITY

## 2023-08-15 RX ORDER — VEDOLIZUMAB 300 MG/5ML
AS DIRECTED INJECTION, POWDER, LYOPHILIZED, FOR SOLUTION INTRAVENOUS
Qty: 300 | Status: ACTIVE | COMMUNITY
Start: 2023-06-21 | End: 2023-09-19

## 2023-08-15 RX ORDER — LAMOTRIGINE 25 MG/1
TAKE ONE TABLET BY MOUTH ONE TIME DAILY FOR 14 DAYS THEN TAKE TWO TABLETS BY MOUTH ONE TIME DAILY FOR MOOD STABILIZATION/ DEPRESSION. REPORT TABLET ORAL
Qty: 45 UNSPECIFIED | Refills: 0 | Status: ACTIVE | COMMUNITY

## 2023-08-15 RX ORDER — RIMEGEPANT SULFATE 75 MG/75MG
TABLET, ORALLY DISINTEGRATING ORAL
Qty: 8 EACH | Status: ACTIVE | COMMUNITY

## 2023-08-15 RX ORDER — TOPIRAMATE 25 MG/1
TAKE ONE TABLET BY MOUTH TWICE A DAY TABLET, FILM COATED ORAL
Qty: 60 UNSPECIFIED | Refills: 2 | Status: ACTIVE | COMMUNITY

## 2023-10-18 ENCOUNTER — OFFICE VISIT (OUTPATIENT)
Dept: URBAN - NONMETROPOLITAN AREA CLINIC 1 | Facility: CLINIC | Age: 28
End: 2023-10-18
Payer: COMMERCIAL

## 2023-10-18 VITALS
SYSTOLIC BLOOD PRESSURE: 111 MMHG | DIASTOLIC BLOOD PRESSURE: 68 MMHG | HEIGHT: 60 IN | WEIGHT: 101.2 LBS | BODY MASS INDEX: 19.87 KG/M2 | TEMPERATURE: 98.2 F

## 2023-10-18 DIAGNOSIS — K51.211 ULCERATIVE PROCTITIS WITH RECTAL BLEEDING: ICD-10-CM

## 2023-10-18 PROCEDURE — 96413 CHEMO IV INFUSION 1 HR: CPT | Performed by: INTERNAL MEDICINE

## 2023-10-18 RX ORDER — VEDOLIZUMAB 300 MG/5ML
AS DIRECTED INJECTION, POWDER, LYOPHILIZED, FOR SOLUTION INTRAVENOUS
Status: ACTIVE | COMMUNITY

## 2023-10-18 RX ORDER — RIMEGEPANT SULFATE 75 MG/75MG
TABLET, ORALLY DISINTEGRATING ORAL
Qty: 8 EACH | Status: ACTIVE | COMMUNITY

## 2023-10-18 RX ORDER — ESCITALOPRAM OXALATE 5 MG/1
TABLET ORAL
Qty: 30 TABLET | Status: ACTIVE | COMMUNITY

## 2023-10-18 RX ORDER — LAMOTRIGINE 25 MG/1
TAKE ONE TABLET BY MOUTH ONE TIME DAILY FOR 14 DAYS THEN TAKE TWO TABLETS BY MOUTH ONE TIME DAILY FOR MOOD STABILIZATION/ DEPRESSION. REPORT TABLET ORAL
Qty: 45 UNSPECIFIED | Refills: 0 | Status: ACTIVE | COMMUNITY

## 2023-10-18 RX ORDER — TOPIRAMATE 25 MG/1
TAKE ONE TABLET BY MOUTH TWICE A DAY TABLET, FILM COATED ORAL
Qty: 60 UNSPECIFIED | Refills: 2 | Status: ACTIVE | COMMUNITY

## 2023-11-06 ENCOUNTER — OFFICE VISIT (OUTPATIENT)
Dept: URBAN - NONMETROPOLITAN AREA CLINIC 2 | Facility: CLINIC | Age: 28
End: 2023-11-06

## 2023-11-08 ENCOUNTER — LAB OUTSIDE AN ENCOUNTER (OUTPATIENT)
Dept: URBAN - NONMETROPOLITAN AREA CLINIC 2 | Facility: CLINIC | Age: 28
End: 2023-11-08

## 2023-12-13 ENCOUNTER — OFFICE VISIT (OUTPATIENT)
Dept: URBAN - NONMETROPOLITAN AREA CLINIC 1 | Facility: CLINIC | Age: 28
End: 2023-12-13
Payer: COMMERCIAL

## 2023-12-13 VITALS
TEMPERATURE: 98.7 F | BODY MASS INDEX: 20.26 KG/M2 | SYSTOLIC BLOOD PRESSURE: 114 MMHG | WEIGHT: 103.2 LBS | DIASTOLIC BLOOD PRESSURE: 76 MMHG | HEIGHT: 60 IN

## 2023-12-13 DIAGNOSIS — K51.20 CHRONIC ULCERATIVE PROCTITIS: ICD-10-CM

## 2023-12-13 PROCEDURE — 96413 CHEMO IV INFUSION 1 HR: CPT | Performed by: INTERNAL MEDICINE

## 2023-12-13 RX ORDER — LAMOTRIGINE 25 MG/1
TAKE ONE TABLET BY MOUTH ONE TIME DAILY FOR 14 DAYS THEN TAKE TWO TABLETS BY MOUTH ONE TIME DAILY FOR MOOD STABILIZATION/ DEPRESSION. REPORT TABLET ORAL
Qty: 45 UNSPECIFIED | Refills: 0 | Status: ACTIVE | COMMUNITY

## 2023-12-13 RX ORDER — ESCITALOPRAM OXALATE 5 MG/1
TABLET ORAL
Qty: 30 TABLET | Status: ACTIVE | COMMUNITY

## 2023-12-13 RX ORDER — TOPIRAMATE 25 MG/1
TAKE ONE TABLET BY MOUTH TWICE A DAY TABLET, FILM COATED ORAL
Qty: 60 UNSPECIFIED | Refills: 2 | Status: ACTIVE | COMMUNITY

## 2023-12-13 RX ORDER — VEDOLIZUMAB 300 MG/5ML
AS DIRECTED INJECTION, POWDER, LYOPHILIZED, FOR SOLUTION INTRAVENOUS
Status: ACTIVE | COMMUNITY

## 2023-12-13 RX ORDER — RIMEGEPANT SULFATE 75 MG/75MG
TABLET, ORALLY DISINTEGRATING ORAL
Qty: 8 EACH | Status: ACTIVE | COMMUNITY

## 2024-02-07 ENCOUNTER — ENT (OUTPATIENT)
Dept: URBAN - NONMETROPOLITAN AREA CLINIC 1 | Facility: CLINIC | Age: 29
End: 2024-02-07